# Patient Record
Sex: FEMALE | Race: WHITE | NOT HISPANIC OR LATINO | URBAN - METROPOLITAN AREA
[De-identification: names, ages, dates, MRNs, and addresses within clinical notes are randomized per-mention and may not be internally consistent; named-entity substitution may affect disease eponyms.]

---

## 2018-04-16 ENCOUNTER — APPOINTMENT (RX ONLY)
Dept: URBAN - METROPOLITAN AREA OTHER 12 | Facility: OTHER | Age: 38
Setting detail: DERMATOLOGY
End: 2018-04-16

## 2018-04-16 DIAGNOSIS — L72.8 OTHER FOLLICULAR CYSTS OF THE SKIN AND SUBCUTANEOUS TISSUE: ICD-10-CM

## 2018-04-16 DIAGNOSIS — D485 NEOPLASM OF UNCERTAIN BEHAVIOR OF SKIN: ICD-10-CM

## 2018-04-16 PROBLEM — D48.5 NEOPLASM OF UNCERTAIN BEHAVIOR OF SKIN: Status: ACTIVE | Noted: 2018-04-16

## 2018-04-16 PROCEDURE — 11900 INJECT SKIN LESIONS </W 7: CPT

## 2018-04-16 PROCEDURE — ? TREATMENT REGIMEN

## 2018-04-16 PROCEDURE — ? COUNSELING

## 2018-04-16 PROCEDURE — 11100: CPT

## 2018-04-16 PROCEDURE — ? BIOPSY BY SHAVE METHOD

## 2018-04-16 PROCEDURE — ? INTRALESIONAL KENALOG

## 2018-04-16 ASSESSMENT — LOCATION DETAILED DESCRIPTION DERM
LOCATION DETAILED: LEFT INGUINAL FOLD
LOCATION DETAILED: RIGHT LABIA MAJORA
LOCATION DETAILED: LOWER STERNUM

## 2018-04-16 ASSESSMENT — LOCATION SIMPLE DESCRIPTION DERM
LOCATION SIMPLE: LEFT INGUINAL FOLD
LOCATION SIMPLE: VULVA
LOCATION SIMPLE: CHEST

## 2018-04-16 ASSESSMENT — LOCATION ZONE DERM
LOCATION ZONE: VULVA
LOCATION ZONE: LEG
LOCATION ZONE: TRUNK

## 2018-04-16 NOTE — PROCEDURE: INTRALESIONAL KENALOG
Expiration Date For Kenalog (Optional): April 2019
Concentration Of Kenalog Solution Injected (Mg/Ml): 10.0
X Size Of Lesion In Cm (Optional): 0
Total Volume (Ccs): 0.2
Detail Level: Detailed
Kenalog Preparation: Kenalog
Consent: The risks of atrophy were reviewed with the patient.
Include Z78.9 (Other Specified Conditions Influencing Health Status) As An Associated Diagnosis?: No
Medical Necessity Clause: This procedure was medically necessary because the lesions that were treated were:
Administered By (Optional): Regina Rodrigez MD
Lot # For Kenalog (Optional): [id number]
Detail Level: Simple
Total Volume (Ccs): 0.1

## 2018-04-16 NOTE — PROCEDURE: BIOPSY BY SHAVE METHOD
Hemostasis: Drysol
Bill 60522 For Specimen Handling/Conveyance To Laboratory?: no
Anesthesia Type: 1% lidocaine with 1:100,000 epinephrine and a 1:10 solution of 8.4% sodium bicarbonate
X Size Of Lesion In Cm: 0
Curettage Text: The wound bed was treated with curettage after the biopsy was performed.
Detail Level: Detailed
Electrodesiccation And Curettage Text: The wound bed was treated with electrodesiccation and curettage after the biopsy was performed.
Notification Instructions: Patient will be notified of biopsy results. However, patient instructed to call the office if not contacted within 2 weeks.
Cryotherapy Text: The wound bed was treated with cryotherapy after the biopsy was performed.
Wound Care: Vaseline
Was A Bandage Applied: Yes
Post-Care Instructions: I reviewed with the patient in detail post-care instructions. Keep covered with vaseline and a bandage until healed.
Billing Type: Third-Party Bill
Biopsy Type: H and E
Type Of Destruction Used: Curettage
Silver Nitrate Text: The wound bed was treated with silver nitrate after the biopsy was performed.
Dressing: bandage
Lab Facility: [id number]
Anesthesia Volume In Cc: 0.5
Consent: Written consent was obtained and risks were reviewed including but not limited to scarring, infection, bleeding, scabbing, incomplete removal, nerve damage and allergy to anesthesia.
Electrodesiccation Text: The wound bed was treated with electrodesiccation after the biopsy was performed.
Lab: [id number]
Biopsy Method: Personna blade

## 2018-06-15 ENCOUNTER — APPOINTMENT (RX ONLY)
Dept: URBAN - METROPOLITAN AREA OTHER 12 | Facility: OTHER | Age: 38
Setting detail: DERMATOLOGY
End: 2018-06-15

## 2018-06-15 DIAGNOSIS — L72.8 OTHER FOLLICULAR CYSTS OF THE SKIN AND SUBCUTANEOUS TISSUE: ICD-10-CM

## 2018-06-15 DIAGNOSIS — Q819 OTHER SPECIFIED ANOMALIES OF SKIN: ICD-10-CM

## 2018-06-15 DIAGNOSIS — Q828 OTHER SPECIFIED ANOMALIES OF SKIN: ICD-10-CM

## 2018-06-15 DIAGNOSIS — Q826 OTHER SPECIFIED ANOMALIES OF SKIN: ICD-10-CM

## 2018-06-15 PROBLEM — Q82.8 OTHER SPECIFIED CONGENITAL MALFORMATIONS OF SKIN: Status: ACTIVE | Noted: 2018-06-15

## 2018-06-15 PROBLEM — L70.0 ACNE VULGARIS: Status: ACTIVE | Noted: 2018-06-15

## 2018-06-15 PROCEDURE — ? COUNSELING

## 2018-06-15 PROCEDURE — 99213 OFFICE O/P EST LOW 20 MIN: CPT | Mod: 25

## 2018-06-15 PROCEDURE — ? TREATMENT REGIMEN

## 2018-06-15 PROCEDURE — 11900 INJECT SKIN LESIONS </W 7: CPT

## 2018-06-15 PROCEDURE — ? INTRALESIONAL KENALOG

## 2018-06-15 ASSESSMENT — LOCATION DETAILED DESCRIPTION DERM
LOCATION DETAILED: RIGHT INGUINAL FOLD
LOCATION DETAILED: LEFT INGUINAL FOLD

## 2018-06-15 ASSESSMENT — LOCATION SIMPLE DESCRIPTION DERM
LOCATION SIMPLE: RIGHT INGUINAL FOLD
LOCATION SIMPLE: LEFT INGUINAL FOLD

## 2018-06-15 ASSESSMENT — LOCATION ZONE DERM: LOCATION ZONE: LEG

## 2018-06-15 NOTE — PROCEDURE: INTRALESIONAL KENALOG
Consent: The risks of atrophy were reviewed with the patient.
Include Z78.9 (Other Specified Conditions Influencing Health Status) As An Associated Diagnosis?: No
Lot # For Kenalog (Optional): [id number]
Administered By (Optional): Dr Jess Monique MD
Kenalog Preparation: Kenalog
Detail Level: Detailed
Concentration Of Kenalog Solution Injected (Mg/Ml): 10.0
Total Volume (Ccs): 0.2
Medical Necessity Clause: This procedure was medically necessary because the lesions that were treated were:
Expiration Date For Kenalog (Optional): Sep 2019
X Size Of Lesion In Cm (Optional): 0

## 2018-10-25 ENCOUNTER — APPOINTMENT (RX ONLY)
Dept: URBAN - METROPOLITAN AREA OTHER 12 | Facility: OTHER | Age: 38
Setting detail: DERMATOLOGY
End: 2018-10-25

## 2018-10-25 DIAGNOSIS — D22 MELANOCYTIC NEVI: ICD-10-CM

## 2018-10-25 DIAGNOSIS — L91.0 HYPERTROPHIC SCAR: ICD-10-CM

## 2018-10-25 DIAGNOSIS — L72.8 OTHER FOLLICULAR CYSTS OF THE SKIN AND SUBCUTANEOUS TISSUE: ICD-10-CM

## 2018-10-25 PROBLEM — D22.9 MELANOCYTIC NEVI, UNSPECIFIED: Status: ACTIVE | Noted: 2018-10-25

## 2018-10-25 PROBLEM — Z85.828 PERSONAL HISTORY OF OTHER MALIGNANT NEOPLASM OF SKIN: Status: ACTIVE | Noted: 2018-10-25

## 2018-10-25 PROCEDURE — ? COUNSELING

## 2018-10-25 PROCEDURE — ? TREATMENT REGIMEN

## 2018-10-25 PROCEDURE — 99213 OFFICE O/P EST LOW 20 MIN: CPT

## 2018-10-25 ASSESSMENT — LOCATION DETAILED DESCRIPTION DERM: LOCATION DETAILED: LOWER STERNUM

## 2018-10-25 ASSESSMENT — LOCATION SIMPLE DESCRIPTION DERM: LOCATION SIMPLE: CHEST

## 2018-10-25 ASSESSMENT — LOCATION ZONE DERM: LOCATION ZONE: TRUNK

## 2021-12-13 NOTE — PROCEDURE: TREATMENT REGIMEN
Called and spoke to the patient's Mother and she states she took Bertin back to walk in care today and she was told the scratch was better so she doesn't need to follow up with Ophthalmology.  Mom was informed we don't have a pediatric Ophthalmologist at this time, but if Bertin develops any further problem she was given the contact information for [facility] as they have 3 Pediatric Ophthalmologists and she verbalized understanding..  
Plan: Recommended glycolic acid on [email or web address]
Detail Level: Zone